# Patient Record
Sex: MALE | Race: WHITE
[De-identification: names, ages, dates, MRNs, and addresses within clinical notes are randomized per-mention and may not be internally consistent; named-entity substitution may affect disease eponyms.]

---

## 2018-01-11 ENCOUNTER — HOSPITAL ENCOUNTER (OUTPATIENT)
Dept: HOSPITAL 88 - CT | Age: 68
End: 2018-01-11
Attending: INTERNAL MEDICINE
Payer: MEDICARE

## 2018-01-11 DIAGNOSIS — R04.2: Primary | ICD-10-CM

## 2018-01-11 LAB
BUN SERPL-MCNC: 17 MG/DL (ref 7–26)
BUN/CREAT SERPL: 21 (ref 6–25)
EGFRCR SERPLBLD CKD-EPI 2021: > 60 ML/MIN (ref 60–?)

## 2018-01-11 PROCEDURE — 36415 COLL VENOUS BLD VENIPUNCTURE: CPT

## 2018-01-11 PROCEDURE — 84520 ASSAY OF UREA NITROGEN: CPT

## 2018-01-11 PROCEDURE — 82565 ASSAY OF CREATININE: CPT

## 2018-01-11 PROCEDURE — 71260 CT THORAX DX C+: CPT

## 2018-01-11 NOTE — DIAGNOSTIC IMAGING REPORT
PROCEDURE:CT CHEST W

COMPARISON:None.

INDICATIONS:Syncopal episode

Technique:Routine protocol Volumetric CT chest after administration of 

100 mL Isovue-370 intravenous contrast. Multiplanar reformatted images. 

DLP: 634.07

 

FINDINGS:

Lungs: Mild centrilobular emphysema. Otherwise, normal

 

Pulmonary nodules: 

Right middle lobe, noncalcified 5 mm (image 82, series 3)

Right upper lobe, noncalcified 5 mm (image 70, series 3)

Left upper lobe, calcified 5 mm (image 44, series 3)

 

Airways: Diffuse cylindrical bronchiectasis with sporadic trace 

bronchial wall thickening.

Lymph nodes: Normal

Pleura: Normal

 

Pulmonary arteries: Normal caliber. No filling defects.

Thoracic aorta and great vessels: Ascending thoracic aorta diameter at 

the level of the right pulmonary artery 4.5 cm (image 73, series 2). 

Mild calcified and noncalcified atheromatous plaque at the great vessel 

origins. Trace noncalcified atheromatous plaque/intimal thickening at 

the origin of the left common carotid artery (image 45, series 2). 

Common carotid arteries are otherwise normal.

 

Heart and pericardium: Normal size. No pericardial effusion. Mild 

atherosclerosis of the left, left anterior descending and circumflex 

coronary arteries. LAD stent.

 

Subdiaphragmatic organs: Imaged portions are normal.

 

Skeleton: Multilevel degenerative disc disease. Preserved vertebral 

body height. Intact.

Soft tissues: Normal

 

CONCLUSION:

 

1. 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

2. Emphysema.

3. Multiple 5 mm pulmonary nodules. If the patient is a smoker or 

exposed to smoke consider followup CT chest in 6-12 months, then 18-24 

months.

 

Dictated by:  Brandon Obrien M.D. on 1/11/2018 at 10:39     

Electronically approved by:  Brandon Obrien M.D. on 1/11/2018 at 

10:39

## 2018-05-10 LAB
BASOPHILS # BLD AUTO: 0.1 10*3/UL (ref 0–0.1)
BASOPHILS NFR BLD AUTO: 0.7 % (ref 0–1)
DEPRECATED NEUTROPHILS # BLD AUTO: 6 10*3/UL (ref 2.1–6.9)
EOSINOPHIL # BLD AUTO: 0.1 10*3/UL (ref 0–0.4)
EOSINOPHIL NFR BLD AUTO: 1.6 % (ref 0–6)
ERYTHROCYTE [DISTWIDTH] IN CORD BLOOD: 12.8 % (ref 11.7–14.4)
HCT VFR BLD AUTO: 34.4 % (ref 38.2–49.6)
HGB BLD-MCNC: 11.7 G/DL (ref 14–18)
LYMPHOCYTES # BLD: 1.3 10*3/UL (ref 1–3.2)
LYMPHOCYTES NFR BLD AUTO: 15.5 % (ref 18–39.1)
MCH RBC QN AUTO: 31.3 PG (ref 28–32)
MCHC RBC AUTO-ENTMCNC: 34 G/DL (ref 31–35)
MCV RBC AUTO: 92 FL (ref 81–99)
MONOCYTES # BLD AUTO: 0.7 10*3/UL (ref 0.2–0.8)
MONOCYTES NFR BLD AUTO: 8.9 % (ref 4.4–11.3)
NEUTS SEG NFR BLD AUTO: 72.7 % (ref 38.7–80)
PLATELET # BLD AUTO: 248 X10E3/UL (ref 140–360)
RBC # BLD AUTO: 3.74 X10E6/UL (ref 4.3–5.7)

## 2018-05-15 ENCOUNTER — HOSPITAL ENCOUNTER (OUTPATIENT)
Dept: HOSPITAL 88 - OR | Age: 68
Discharge: HOME | End: 2018-05-15
Attending: INTERNAL MEDICINE
Payer: MEDICARE

## 2018-05-15 DIAGNOSIS — K57.30: ICD-10-CM

## 2018-05-15 DIAGNOSIS — K64.8: ICD-10-CM

## 2018-05-15 DIAGNOSIS — K31.1: ICD-10-CM

## 2018-05-15 DIAGNOSIS — F41.9: ICD-10-CM

## 2018-05-15 DIAGNOSIS — E78.00: ICD-10-CM

## 2018-05-15 DIAGNOSIS — K29.70: Primary | ICD-10-CM

## 2018-05-15 DIAGNOSIS — G47.33: ICD-10-CM

## 2018-05-15 DIAGNOSIS — Z01.810: ICD-10-CM

## 2018-05-15 DIAGNOSIS — Z95.5: ICD-10-CM

## 2018-05-15 DIAGNOSIS — K51.50: ICD-10-CM

## 2018-05-15 DIAGNOSIS — Z79.82: ICD-10-CM

## 2018-05-15 DIAGNOSIS — Z87.891: ICD-10-CM

## 2018-05-15 DIAGNOSIS — I25.10: ICD-10-CM

## 2018-05-15 DIAGNOSIS — Z79.02: ICD-10-CM

## 2018-05-15 DIAGNOSIS — K44.9: ICD-10-CM

## 2018-05-15 DIAGNOSIS — K20.9: ICD-10-CM

## 2018-05-15 DIAGNOSIS — I10: ICD-10-CM

## 2018-05-15 DIAGNOSIS — Z01.812: ICD-10-CM

## 2018-05-15 DIAGNOSIS — K25.9: ICD-10-CM

## 2018-05-15 LAB — LACTOFERRIN STL QL: NEGATIVE

## 2018-05-15 PROCEDURE — 43450 DILATE ESOPHAGUS 1/MULT PASS: CPT

## 2018-05-15 PROCEDURE — 43239 EGD BIOPSY SINGLE/MULTIPLE: CPT

## 2018-05-15 PROCEDURE — 88312 SPECIAL STAINS GROUP 1: CPT

## 2018-05-15 PROCEDURE — 87493 C DIFF AMPLIFIED PROBE: CPT

## 2018-05-15 PROCEDURE — 88305 TISSUE EXAM BY PATHOLOGIST: CPT

## 2018-05-15 PROCEDURE — 43245 EGD DILATE STRICTURE: CPT

## 2018-05-15 PROCEDURE — 87177 OVA AND PARASITES SMEARS: CPT

## 2018-05-15 PROCEDURE — 93005 ELECTROCARDIOGRAM TRACING: CPT

## 2018-05-15 PROCEDURE — 45380 COLONOSCOPY AND BIOPSY: CPT

## 2018-05-15 PROCEDURE — 87045 FECES CULTURE AEROBIC BACT: CPT

## 2018-05-15 PROCEDURE — 87328 CRYPTOSPORIDIUM AG IA: CPT

## 2018-05-15 PROCEDURE — 45378 DIAGNOSTIC COLONOSCOPY: CPT

## 2018-05-15 PROCEDURE — 36415 COLL VENOUS BLD VENIPUNCTURE: CPT

## 2018-05-15 PROCEDURE — 83630 LACTOFERRIN FECAL (QUAL): CPT

## 2018-05-15 PROCEDURE — 83993 ASSAY FOR CALPROTECTIN FECAL: CPT

## 2018-05-15 PROCEDURE — 85025 COMPLETE CBC W/AUTO DIFF WBC: CPT

## 2018-05-15 NOTE — OPERATIVE REPORT
DATE OF PROCEDURE:  May 15, 2018 



REFERRING PHYSICIAN:  Dr. Aleksey Cardenas 



PROCEDURES PERFORMED

1. Esophagogastroduodenoscopy with balloon dilatation of pyloric channel 

stricture and biopsies.

2. Colonoscopy with biopsies.  



INDICATIONS FOR EGD:  History of melena, history of gastric ulcers.  



INDICATIONS FOR COLONOSCOPY:  Colorectal cancer screening.  Personal 

history of colon polyps.  History of intermittent diarrhea.



MEDICATION:  Patient was done under MAC.  Please see anesthesiologist's 

note.



PROCEDURE:  With the patient in the left lateral decubitus position, the 

flexible fiberoptic Olympus gastroscope was introduced into the esophagus 

under direct visualization without any difficulty.  There was some patchy 

erythema noted in the distal esophagus.  The scope was then advanced with 

ease into the stomach, traversing a small hiatal hernia.  Mucosa overlying 

the antrum and the body revealed some patchy erythema and low-grade to 

moderate edema, and biopsies were obtained and sent to stain for H. pylori. 

 The pyloric channel was strictured and ulcerated.  It was dilated to size 

18 mm.  No dilatation to a larger size was carried out due to the 

friability and the ulceration of the area to avoid a possible perforation.  

After the balloon dilatation, the scope still could not be advanced to the 

duodenum.  The scope was then retroflexed.  The mucosa overlying the fundus 

and the cardia appeared to be within normal limits.  The scope was then 

straightened out.  The stomach was decompressed.  Scope was subsequently 

withdrawn.  Patient tolerated the procedure well.



IMPRESSION

1. Distal esophagitis.

2. Small hiatal hernia.

3. Gastritis, biopsied.  Biopsies sent to stain for H. pylori.

4. Ulcerated pyloric channel stricture could not be traversed with the 

scope.  Dilated to size 18 mm per TTS balloon dilators.  Scope could not 

traverse the stricture even after the dilatation.  No additional 

dilatation to a larger size was carried out to avoid possible 

perforation as the site was ulcerated and friable.



PLAN:  Follow up histology.  Initiate Protonix 40 mg 1 p.o. q.a.m. a.c.  

Will attempt a repeat EGD in about 2 to 3 weeks with additional dilatation 

to hopefully size 20 mm per TTS balloon dilators.  



The patient was then turned around.  After adequate lubrication of the anal 

canal, a flexible fiberoptic Olympus colonoscope was inserted into the 

rectum with ease and advanced all the way to the cecum.  Mucosa overlying 

the cecum, ascending colon and transverse appeared to be within normal 

limits.  Mucosa overlying the left colon revealed some patchy, mild, 

inflammatory changes.  Some biopsies were obtained.  Diverticular disease 

was noted to involve the sigmoid colon.  The scope was then retroflexed 

into the distal rectum, and moderate-size internal hemorrhoids were noted, 

none of which was actively bleeding.  The scope was then straightened out.  

It was subsequently withdrawn after securing an adequate stool specimen 

that was sent for the appropriate stool studies.  Patient tolerated the 

procedure well.  



IMPRESSION

1. Mild, patchy, left-sided colitis.

2. Diverticulosis.  

3. Internal hemorrhoids, none actively bleeding.  





PLAN:  Follow up histology.  Follow up stool studies.  Initiate VSL #3 DS 1 

p.o. daily.  Patient might benefit from a followup colonoscopy in 3 to 5 

years.  







DD:  05/15/2018 15:41

DT:  05/15/2018 16:00

Job#:  [id number] 



cc:ALEKSEY CARDENAS MD

## 2018-05-15 NOTE — XMS REPORT
Patient Summary Document

 Created on: 05/15/2018



ALISSON TORRES

External Reference #: [id number]

: 1950

Sex: Male



Demographics







 Address  [address] [place] [address]

 

 Home Phone  [phone]

 

 Preferred Language  Unknown

 

 Marital Status  Unknown

 

 Latter-day Affiliation  Unknown

 

 Race  Unknown

 

 Additional Race(s)  

 

 Ethnic Group  Unknown





Author







 [facility]  [facility]

 

 Address  Unknown

 

 Phone  Unavailable







Care Team Providers







 Care Team Member Name  Role  Phone

 

 SAI LARIOS  Unavailable  Unavailable







Problems

This patient has no known problems.



Allergies, Adverse Reactions, Alerts

This patient has no known allergies or adverse reactions.



Medications

This patient has no known medications.



Results







 Test Description  Test Time  Test Comments  Text Results  Atomic Results  
Result Comments









 CT CHEST W            [place] [address]      Patient Name: ALISSON TORRES
   MR #: [id number]    : 1950 Age/Sex: 67/M  Acct #: [id number] Req #
: [id number]  Adm Physician:     Ordered by: SAI LARIOS MD  Report #: [id number]
[id number]   Location: CT  Room/Bed:     _____________________________________________
______________________________________________________    Procedure: [id number] 
CT/CT CHEST W  Exam Date: 18                            Exam Time: 930  
     REPORT STATUS: Signed    PROCEDURE:   CT CHEST W   COMPARISON:   None.   
INDICATIONS:   Syncopal episode   Technique:   Routine protocol Volumetric CT 
chest after administration of    100 mL Isovue-370 intravenous contrast. 
Multiplanar reformatted images.    DLP: 634.07       FINDINGS:      Lungs: Mild 
centrilobular emphysema. Otherwise, normal       Pulmonary nodules:    Right 
middle lobe, noncalcified 5 mm (image 82, series 3)   Right upper lobe, 
noncalcified 5 mm (image 70, series 3)   Left upper lobe, calcified 5 mm (image 
44, series 3)       Airways: Diffuse cylindrical bronchiectasis with sporadic 
trace    bronchial wall thickening.   Lymph nodes: Normal   Pleura: Normal     
  Pulmonary arteries: Normal caliber. No filling defects.   Thoracic aorta and 
great vessels: Ascending thoracic aorta diameter at    the level of the right 
pulmonary artery 4.5 cm (image 73, series 2).    Mild calcified and 
noncalcified atheromatous plaque at the great vessel    origins. Trace 
noncalcified atheromatous plaque/intimal thickening at    the origin of the 
left common carotid artery (image 45, series 2).    Common carotid arteries are 
otherwise normal.       Heart and pericardium: Normal size. No pericardial 
effusion. Mild    atherosclerosis of the left, left anterior descending and 
circumflex    coronary arteries. LAD stent.       Subdiaphragmatic organs: 
Imaged portions are normal.       Skeleton: Multilevel degenerative disc 
disease. Preserved vertebral    body height. Intact.   Soft tissues: Normal    
   CONCLUSION:       1. 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   2. Emphysema.   3. 
Multiple 5 mm pulmonary nodules. If the patient is a smoker or    exposed to 
smoke consider followup CT chest in 6-12 months, then 18-24    months.       
Dictated by:  Sydney Obrien M.D. on 2018 at 10:39        
Electronically approved by:  Sydney Obrien M.D. on 2018 at    10:39 
               Dictated By: SYDNEY OBRIEN MD  Electronically Signed By: SYDNEY OBRIEN MD on 18 1039  Transcribed By: GARDENIA on 18 1039       COPY 
TO:   SAI LARIOS MD

## 2018-05-16 LAB — C DIFFICILE TOXIN A&B AMP PROB: NEGATIVE

## 2018-06-13 ENCOUNTER — HOSPITAL ENCOUNTER (OUTPATIENT)
Dept: HOSPITAL 88 - OR | Age: 68
Discharge: HOME | End: 2018-06-13
Attending: INTERNAL MEDICINE
Payer: MEDICARE

## 2018-06-13 DIAGNOSIS — K44.9: ICD-10-CM
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
## 2018-06-13 DIAGNOSIS — F41.9: ICD-10-CM

## 2018-06-13 DIAGNOSIS — K31.1: Primary | ICD-10-CM

## 2018-06-13 DIAGNOSIS — H91.90: ICD-10-CM

## 2018-06-13 DIAGNOSIS — Z87.891: ICD-10-CM

## 2018-06-13 DIAGNOSIS — K57.92: ICD-10-CM

## 2018-06-13 DIAGNOSIS — K29.70: ICD-10-CM

## 2018-06-13 DIAGNOSIS — S36.33XA: ICD-10-CM
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
## 2018-06-13 DIAGNOSIS — Z79.82: ICD-10-CM

## 2018-06-13 DIAGNOSIS — K25.9: ICD-10-CM

## 2018-06-13 DIAGNOSIS — R13.10: ICD-10-CM
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
## 2018-06-13 DIAGNOSIS — Z79.02: ICD-10-CM

## 2018-06-13 DIAGNOSIS — I10: ICD-10-CM

## 2018-06-13 LAB
BASOPHILS # BLD AUTO: 0.1 10*3/UL (ref 0–0.1)
BASOPHILS NFR BLD AUTO: 0.9 % (ref 0–1)
DEPRECATED NEUTROPHILS # BLD AUTO: 3.4 10*3/UL (ref 2.1–6.9)
EOSINOPHIL # BLD AUTO: 0.2 10*3/UL (ref 0–0.4)
EOSINOPHIL NFR BLD AUTO: 3 % (ref 0–6)
ERYTHROCYTE [DISTWIDTH] IN CORD BLOOD: 12.5 % (ref 11.7–14.4)
HCT VFR BLD AUTO: 35.4 % (ref 38.2–49.6)
HGB BLD-MCNC: 11.8 G/DL (ref 14–18)
LYMPHOCYTES # BLD: 1 10*3/UL (ref 1–3.2)
LYMPHOCYTES NFR BLD AUTO: 19.7 % (ref 18–39.1)
MCH RBC QN AUTO: 30.3 PG (ref 28–32)
MCHC RBC AUTO-ENTMCNC: 33.3 G/DL (ref 31–35)
MCV RBC AUTO: 91 FL (ref 81–99)
MONOCYTES # BLD AUTO: 0.6 10*3/UL (ref 0.2–0.8)
MONOCYTES NFR BLD AUTO: 11 % (ref 4.4–11.3)
NEUTS SEG NFR BLD AUTO: 64.3 % (ref 38.7–80)
PLATELET # BLD AUTO: 255 X10E3/UL (ref 140–360)
RBC # BLD AUTO: 3.89 X10E6/UL (ref 4.3–5.7)

## 2018-06-13 PROCEDURE — 43450 DILATE ESOPHAGUS 1/MULT PASS: CPT

## 2018-06-13 PROCEDURE — 43245 EGD DILATE STRICTURE: CPT

## 2018-06-13 PROCEDURE — 43239 EGD BIOPSY SINGLE/MULTIPLE: CPT

## 2018-06-13 PROCEDURE — 36415 COLL VENOUS BLD VENIPUNCTURE: CPT

## 2018-06-13 PROCEDURE — 85025 COMPLETE CBC W/AUTO DIFF WBC: CPT

## 2018-06-13 NOTE — OPERATIVE REPORT
DATE OF PROCEDURE:  June 13, 2018 



REFERRING PHYSICIAN:  Dr. Aleksey Cardenas 



PROCEDURE PERFORMED:  Esophagogastroduodenoscopy with pyloric stricture 

balloon dilatation.  



INDICATIONS FOR PROCEDURE:  Tight pyloric stricture dilated in the recent 

past to size 18 mm per TTS balloon dilators, and the scope could not 

traverse the stricture even after dilatation.   For repeat EGD with 

additional balloon dilatation.  



MEDICATION:  Patient was done under MAC.  Please see anesthesiologist's 

note.



PROCEDURE:  With the patient in the left lateral decubitus position, the 

flexible fiberoptic Olympus gastroscope was introduced into the esophagus 

under direct visualization without any difficulty.  There was a small 

sliding hiatal hernia noted, and the scope was advanced with ease into the 

stomach.  Mucosa overlying the antrum and the body revealed some patchy 

erythema.  The pyloric channel stricture was noted and could not be 

traversed with the scope.  It was then dilated to size 20 mm per TTS 

balloon dilators.  The stricture post dilatation was traversed with ease, 

and the scope was advanced all the way to the 2nd portion of the duodenum.  

The scope was then withdrawn slowly.  Mucosa overlying the proximal 2nd 

portion and the duodenal bulb appeared to be within normal limits.  The 

scope was then withdrawn back into the stomach and retroflexed.  The mucosa 

overlying the fundus and cardia appeared to be within normal limits.  The 

scope was then straightened out.  A mucosal tear was noted in the 

peripyloric area involving the antrum.  That was closed with a Hemoclip.  

The scope was subsequently withdrawn.  Patient tolerated the procedure 

well.



IMPRESSION

1. Small sliding hiatal hernia.

2. Gastritis, mild.

3. Tight pyloric channel stricture could not be traversed with scope, 

dilated to size 20 mm per TTS balloon dilators.  An antral mucosal tear 

was sustained during the dilatation, and that was closed with a 

Hemoclip.  



PLAN:  Continue Protonix 40 mg 1 p.o. q.a.m. a.c.  

  







DD:  06/13/2018 13:07

DT:  06/13/2018 13:20

Job#:  [id number] 



cc:ALEKSEY CARDENAS MD